# Patient Record
Sex: FEMALE | Race: OTHER
[De-identification: names, ages, dates, MRNs, and addresses within clinical notes are randomized per-mention and may not be internally consistent; named-entity substitution may affect disease eponyms.]

---

## 2021-01-14 ENCOUNTER — HOSPITAL ENCOUNTER (EMERGENCY)
Dept: HOSPITAL 62 - ER | Age: 21
LOS: 1 days | Discharge: LEFT BEFORE DISCHARGE | End: 2021-01-15
Payer: OTHER GOVERNMENT

## 2021-01-14 VITALS — DIASTOLIC BLOOD PRESSURE: 52 MMHG | SYSTOLIC BLOOD PRESSURE: 124 MMHG

## 2021-01-14 DIAGNOSIS — R11.0: ICD-10-CM

## 2021-01-14 DIAGNOSIS — Z53.20: Primary | ICD-10-CM

## 2021-01-14 LAB
APPEARANCE UR: CLEAR
APTT PPP: YELLOW S
BILIRUB UR QL STRIP: NEGATIVE
GLUCOSE UR STRIP-MCNC: NEGATIVE MG/DL
KETONES UR STRIP-MCNC: (no result) MG/DL
NITRITE UR QL STRIP: NEGATIVE
PH UR STRIP: 6 [PH] (ref 5–9)
PROT UR STRIP-MCNC: NEGATIVE MG/DL
SP GR UR STRIP: 1.02
UROBILINOGEN UR-MCNC: 4 MG/DL (ref ?–2)

## 2021-01-14 PROCEDURE — 81001 URINALYSIS AUTO W/SCOPE: CPT

## 2021-01-14 PROCEDURE — 81025 URINE PREGNANCY TEST: CPT

## 2021-01-14 PROCEDURE — 99281 EMR DPT VST MAYX REQ PHY/QHP: CPT

## 2021-01-14 NOTE — ER DOCUMENT REPORT
ED Medical Screen (RME)





- General


Stated Complaint: NAUSEA DUE TO MEDICATION





- HPI


Notes: 





01/14/21 21:45


Rapid Medical Exam                                





HPI: 21yo female c/o n/v related to severe anxiety. says her anxiety is related 

to her significant others ''legal problems''.  Pt says she went to [facility] 

recently for this and they started her on reflux medications.   Pt was seen 

today and was told to go to the ER for possible IVF and maybe antibiotics. says 

that she may have a UTI but denies symptoms.  Pt is not on anxiety medications. 

says she is only vomiting ''bile''. 











Physical Exam:





GENERAL: Well-appearing, well-nourished and in no acute distress.


HEAD: Atraumatic, normocephalic.


ENT: Moist mucous membranes.


RESP: Respirations even and unlabored


CV- tachy- 110


NEURO: No focal neurological deficits. Moves all extremities spontaneously and 

on command.








My involvement in this patients care was limited to a rapid initial assessment. 

A comprehensive ED assessment and evaluation of the patient, analysis of test 

results, treatment, and completion of the medical decision making process will 

be performed by other ER providers.





Physical Exam





- Vital signs


Vitals: 





                                        











Temp Pulse BP Pulse Ox


 


 99.1 F   137 H  124/52 L  97 


 


 01/14/21 19:52  01/14/21 19:52  01/14/21 19:52  01/14/21 19:52














Course





- Vital Signs


Vital signs: 





                                        











Temp Pulse Resp BP Pulse Ox


 


 99.1 F   137 H     124/52 L  97 


 


 01/14/21 19:52  01/14/21 19:52     01/14/21 19:52  01/14/21 19:52